# Patient Record
Sex: FEMALE | Race: WHITE | HISPANIC OR LATINO | URBAN - METROPOLITAN AREA
[De-identification: names, ages, dates, MRNs, and addresses within clinical notes are randomized per-mention and may not be internally consistent; named-entity substitution may affect disease eponyms.]

---

## 2017-03-22 ENCOUNTER — OUTPATIENT (OUTPATIENT)
Dept: OUTPATIENT SERVICES | Facility: HOSPITAL | Age: 32
LOS: 1 days | Discharge: HOME | End: 2017-03-22

## 2017-06-27 DIAGNOSIS — F12.10 CANNABIS ABUSE, UNCOMPLICATED: ICD-10-CM

## 2017-06-27 DIAGNOSIS — F10.10 ALCOHOL ABUSE, UNCOMPLICATED: ICD-10-CM

## 2019-07-23 ENCOUNTER — TRANSCRIPTION ENCOUNTER (OUTPATIENT)
Age: 34
End: 2019-07-23

## 2019-07-27 ENCOUNTER — TRANSCRIPTION ENCOUNTER (OUTPATIENT)
Age: 34
End: 2019-07-27

## 2019-08-08 ENCOUNTER — TRANSCRIPTION ENCOUNTER (OUTPATIENT)
Age: 34
End: 2019-08-08

## 2020-04-26 ENCOUNTER — MESSAGE (OUTPATIENT)
Age: 35
End: 2020-04-26

## 2022-03-19 PROBLEM — Z00.00 ENCOUNTER FOR PREVENTIVE HEALTH EXAMINATION: Status: ACTIVE | Noted: 2022-03-19

## 2022-07-25 ENCOUNTER — NON-APPOINTMENT (OUTPATIENT)
Age: 37
End: 2022-07-25

## 2022-11-06 ENCOUNTER — EMERGENCY (EMERGENCY)
Facility: HOSPITAL | Age: 37
LOS: 0 days | Discharge: HOME | End: 2022-11-06
Attending: EMERGENCY MEDICINE | Admitting: EMERGENCY MEDICINE

## 2022-11-06 VITALS
RESPIRATION RATE: 20 BRPM | SYSTOLIC BLOOD PRESSURE: 122 MMHG | OXYGEN SATURATION: 99 % | HEART RATE: 64 BPM | TEMPERATURE: 98 F | HEIGHT: 61 IN | DIASTOLIC BLOOD PRESSURE: 58 MMHG | WEIGHT: 123.02 LBS

## 2022-11-06 VITALS
HEART RATE: 67 BPM | RESPIRATION RATE: 18 BRPM | OXYGEN SATURATION: 97 % | SYSTOLIC BLOOD PRESSURE: 113 MMHG | DIASTOLIC BLOOD PRESSURE: 81 MMHG | TEMPERATURE: 96 F

## 2022-11-06 DIAGNOSIS — M25.512 PAIN IN LEFT SHOULDER: ICD-10-CM

## 2022-11-06 DIAGNOSIS — M54.2 CERVICALGIA: ICD-10-CM

## 2022-11-06 DIAGNOSIS — M54.6 PAIN IN THORACIC SPINE: ICD-10-CM

## 2022-11-06 DIAGNOSIS — Z87.39 PERSONAL HISTORY OF OTHER DISEASES OF THE MUSCULOSKELETAL SYSTEM AND CONNECTIVE TISSUE: ICD-10-CM

## 2022-11-06 PROCEDURE — 99284 EMERGENCY DEPT VISIT MOD MDM: CPT

## 2022-11-06 RX ORDER — OXYCODONE AND ACETAMINOPHEN 5; 325 MG/1; MG/1
2 TABLET ORAL ONCE
Refills: 0 | Status: DISCONTINUED | OUTPATIENT
Start: 2022-11-06 | End: 2022-11-06

## 2022-11-06 RX ORDER — DEXAMETHASONE 0.5 MG/5ML
10 ELIXIR ORAL ONCE
Refills: 0 | Status: COMPLETED | OUTPATIENT
Start: 2022-11-06 | End: 2022-11-06

## 2022-11-06 RX ORDER — KETOROLAC TROMETHAMINE 30 MG/ML
30 SYRINGE (ML) INJECTION ONCE
Refills: 0 | Status: DISCONTINUED | OUTPATIENT
Start: 2022-11-06 | End: 2022-11-06

## 2022-11-06 RX ADMIN — OXYCODONE AND ACETAMINOPHEN 2 TABLET(S): 5; 325 TABLET ORAL at 10:08

## 2022-11-06 RX ADMIN — Medication 10 MILLIGRAM(S): at 11:02

## 2022-11-06 RX ADMIN — Medication 30 MILLIGRAM(S): at 10:08

## 2022-11-06 NOTE — ED PROVIDER NOTE - NS ED ATTENDING STATEMENT MOD
This was a shared visit with the LEONARD. I reviewed and verified the documentation and independently performed the documented:

## 2022-11-06 NOTE — ED PROVIDER NOTE - CARE PROVIDER_API CALL
Adrianna Natarajan)  Neurosurgery  [address], [address]  [place] [address]  Phone: [phone]  Fax: [phone]  Follow Up Time:

## 2022-11-06 NOTE — ED PROVIDER NOTE - OBJECTIVE STATEMENT
Patient c/o several weeks of left sided neck pain radiating to shoulder, H/o herniated cervical disc. Seen by PMD take naprosyn, robaxin without improvement,

## 2022-11-06 NOTE — ED PROVIDER NOTE - PATIENT PORTAL LINK FT
You can access the FollowMyHealth Patient Portal offered by [facility] by registering at the following website: [email or web address]. By joining [facility]’s FollowMyHealth portal, you will also be able to view your health information using other applications (apps) compatible with our system.

## 2022-11-06 NOTE — ED PROVIDER NOTE - NSFOLLOWUPINSTRUCTIONS_ED_ALL_ED_FT
continue meds, Take medrol dose pack as directed, See rehab and neurosurgery for follow up   Our Emergency Department Referral Coordinators will be reaching out to you in the next 24-48 hours from 9:00am to 5:00pm with a follow up appointment. Please expect a phone call from the hospital in that time frame. If you do not receive a call or if you have any questions or concerns, you can reach them at [phone] or [phone].

## 2022-11-06 NOTE — ED PROVIDER NOTE - CLINICAL SUMMARY MEDICAL DECISION MAKING FREE TEXT BOX
chronic intermittent pain to the neck related to bulging discs. Pain meds and short course of steroids.

## 2022-11-06 NOTE — ED PROVIDER NOTE - ATTENDING APP SHARED VISIT CONTRIBUTION OF CARE
Left upper back pain for 2-3 weeks. Has had in the past. Hx of bulging disc in the cervical spine. No shortness of breath. No rash. Tingling sensation to the left arm. Pt take different pain meds. On exam left upper back tenderness, no rash, good motor strength upper extremity and good sensory. Normal gait.

## 2022-11-06 NOTE — ED PROVIDER NOTE - MUSCULOSKELETAL, MLM
tenderness left neck and shoulder region with muscle spasm, FROM, NV intact, no weakness, sensation intact

## 2022-11-07 PROBLEM — Z78.9 OTHER SPECIFIED HEALTH STATUS: Chronic | Status: ACTIVE | Noted: 2022-11-06

## 2022-11-08 ENCOUNTER — EMERGENCY (EMERGENCY)
Facility: HOSPITAL | Age: 37
LOS: 0 days | Discharge: HOME | End: 2022-11-08
Admitting: EMERGENCY MEDICINE

## 2022-11-08 VITALS
TEMPERATURE: 97 F | OXYGEN SATURATION: 100 % | HEART RATE: 61 BPM | HEIGHT: 61 IN | DIASTOLIC BLOOD PRESSURE: 82 MMHG | SYSTOLIC BLOOD PRESSURE: 122 MMHG | RESPIRATION RATE: 16 BRPM | WEIGHT: 123.02 LBS

## 2022-11-08 DIAGNOSIS — M79.602 PAIN IN LEFT ARM: ICD-10-CM

## 2022-11-08 DIAGNOSIS — M54.2 CERVICALGIA: ICD-10-CM

## 2022-11-08 DIAGNOSIS — M54.12 RADICULOPATHY, CERVICAL REGION: ICD-10-CM

## 2022-11-08 PROCEDURE — 99284 EMERGENCY DEPT VISIT MOD MDM: CPT

## 2022-11-08 RX ORDER — ONDANSETRON 8 MG/1
4 TABLET, FILM COATED ORAL ONCE
Refills: 0 | Status: DISCONTINUED | OUTPATIENT
Start: 2022-11-08 | End: 2022-11-08

## 2022-11-08 RX ORDER — KETOROLAC TROMETHAMINE 30 MG/ML
30 SYRINGE (ML) INJECTION ONCE
Refills: 0 | Status: DISCONTINUED | OUTPATIENT
Start: 2022-11-08 | End: 2022-11-08

## 2022-11-08 RX ORDER — TIZANIDINE 4 MG/1
1 TABLET ORAL
Qty: 15 | Refills: 0
Start: 2022-11-08 | End: 2022-11-12

## 2022-11-08 RX ORDER — ONDANSETRON 8 MG/1
1 TABLET, FILM COATED ORAL
Qty: 8 | Refills: 0
Start: 2022-11-08 | End: 2022-11-11

## 2022-11-08 RX ADMIN — Medication 30 MILLIGRAM(S): at 13:03

## 2022-11-08 RX ADMIN — Medication 50 MILLIGRAM(S): at 13:03
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
## 2022-11-08 NOTE — ED PROVIDER NOTE - NS ED ROS FT
Constitutional: (-) fever  Eyes/ENT: (-) visual changes   Cardiovascular: (-) chest pain, (-) syncope  Respiratory: (-) cough, (-) shortness of breath  Gastrointestinal: (-) vomiting, (-) diarrhea  Genitourinary: (-) dysuria, (-) hesitancy, (-) frequency   Musculoskeletal: (-) neck pain, (-) back pain, L arm pain  Integumentary: (-) rash, (-) edema  Neurological: (-) headache, (-) altered mental status  Allergic/Immunologic: (-) pruritus

## 2022-11-08 NOTE — ED PROVIDER NOTE - CLINICAL SUMMARY MEDICAL DECISION MAKING FREE TEXT BOX
37-year-old woman presents with severe left-sided neck pain traveling down into the left arm for the past few days not responsive to outpatient therapy.  Patient clearly with signs of cervical radiculopathy on examination.  Patient has schedule appointment with neurosurgery at the end of December.  Patient is currently just started a Medrol Dosepak.  Patient will require analgesia and prednisone rather than Medrol Dosepak.  We will also provide a referral for to provide patient with a sooner appointment with neurosurgery hopefully. No left arm weakness or evidence of atrophy on exam. Reassurance analgesia outpatient follow-up return for any new or worsening symptoms.

## 2022-11-08 NOTE — ED PROVIDER NOTE - NSFOLLOWUPINSTRUCTIONS_ED_ALL_ED_FT
Please follow up with your primary care physician within 24-72 hours and return immediately if symptoms worsen.    Arm Pain    WHAT YOU NEED TO KNOW:    Your arm pain may be caused by a number of conditions. Examples include arthritis, nerve problems, or an awkward position while you sleep. X-rays did not show a broken bone in your arm or wrist. Arm pain may be a sign of a serious condition that needs immediate care, such as a heart attack.    DISCHARGE INSTRUCTIONS:    Call 911 for any of the following: You have any of the following signs of a heart attack:     Squeezing, pressure, or pain in your chest that lasts longer than 5 minutes or returns      Discomfort or pain in your back, neck, jaw, stomach, or arm       Trouble breathing or a fast, fluttery heartbeat      Nausea or vomiting      Lightheadedness or a sudden cold sweat, especially with chest pain or trouble breathing    Return to the emergency department if:     You have severe pain, or pain that spreads from your arm to other areas.      You have swelling, tingling, or numbness in your hand or fingers, or the skin turns blue.      You cannot move your arm.    Contact your healthcare provider if:     You have questions or concerns about your condition or care.        Medicines: You may need any of the following:     Prescription pain medicine may be given. Ask how to take this medicine safely.      NSAIDs, such as ibuprofen, help decrease swelling, pain, and fever. This medicine is available with or without a doctor's order. NSAIDs can cause stomach bleeding or kidney problems in certain people. If you take blood thinner medicine, always ask your healthcare provider if NSAIDs are safe for you. Always read the medicine label and follow directions.      Take your medicine as directed. Contact your healthcare provider if you think your medicine is not helping or if you have side effects. Tell him or her if you are allergic to any medicine. Keep a list of the medicines, vitamins, and herbs you take. Include the amounts, and when and why you take them. Bring the list or the pill bottles to follow-up visits. Carry your medicine list with you in case of an emergency.    Self-care:     Rest your arm as directed. A sling may be used to keep your arm from moving while it heals.      Apply ice as directed. Ice helps decrease pain and swelling. Ice may also help prevent tissue damage. Use an ice pack, or put crushed ice in a plastic bag. Cover it with a towel. Apply it to your arm for 20 minutes every few hours, or as directed. Ask how many times to apply ice each day, and for how many days.      Elevate your arm above the level of your heart as often as you can. This will help decrease swelling and pain. Prop your arm on pillows or blankets to keep the area elevated comfortably.      Adjust your position if you work in front of a computer. You may need arm or wrist supports or change the height of your chair.       Keep a pain record. Write down when your pain happens and how severe it is. Include any other symptoms you have with your pain. A record will help you keep track of pain cycles. Bring the record with you to your follow-up visits. It may also help your healthcare provider find out what is causing your pain.    Follow up with your healthcare provider as directed: You may need physical therapy. You may need to see an orthopedic specialist. Write down your questions so you remember to ask them during your visits.       © Copyright [employer] 2019 All illustrations and images included in CareNotes are the copyrighted property of [employer] Inc[employer] or [employer].

## 2022-11-08 NOTE — ED PROVIDER NOTE - PHYSICAL EXAMINATION
Gen: NAD, AOx3  Head: NCAT  HEENT: PERRL, oral mucosa moist, normal conjunctiva, oropharynx clear without exudate or erythema  Lung: CTAB, no respiratory distress, no wheezing, rales, rhonchi  CV: normal s1/s2, rrr, Normal perfusion, pulses 2+ throughout  Abd: soft, NTND, no CVA tenderness  Genitourinary: no pelvic tenderness  MSK: No edema, no visible deformities, full range of motion in all 4 extremities   Neuro: CN II-XII grossly intact, No focal neurologic deficits, strength 5/5 BUE/BLE, steady gait   Skin: No rash   Psych: normal affect

## 2022-11-08 NOTE — ED PROVIDER NOTE - PATIENT PORTAL LINK FT
You can access the FollowMyHealth Patient Portal offered by [facility] by registering at the following website: [email or web address]. By joining [facility]’s FollowMyHealth portal, you will also be able to view your health information using other applications (apps) compatible with our system.

## 2022-11-10 ENCOUNTER — APPOINTMENT (OUTPATIENT)
Dept: PAIN MANAGEMENT | Facility: CLINIC | Age: 37
End: 2022-11-10

## 2022-11-10 VITALS
HEIGHT: 61 IN | BODY MASS INDEX: 23.22 KG/M2 | SYSTOLIC BLOOD PRESSURE: 120 MMHG | WEIGHT: 123 LBS | DIASTOLIC BLOOD PRESSURE: 92 MMHG | HEART RATE: 97 BPM

## 2022-11-10 PROCEDURE — 99204 OFFICE O/P NEW MOD 45 MIN: CPT

## 2022-11-11 NOTE — REASON FOR VISIT
[Initial Consultation] : an initial pain management consultation [FreeTextEntry2] : evaluation neck back left arm and hand

## 2022-11-11 NOTE — PHYSICAL EXAM
[de-identified] : Cervical Spine Exam:\par \par Inspection:\par \par erythema (-) \par ecchymosis (-) \par rashes (-) \par \par Palpation:   \par                                                \par Cervical paraspinal mm tenderness:   R (-); L(-)\par Upper trapezius mm tenderness:        R (-); L (-)\par \par ROM:  \par restricted ROM in lateral rotation b/l, flexion, extension  \par \par Strength Testing:            Right    Left\par Deltoid                             (5/5)    (5/5)\par Biceps:                            (5/5)    (5/5)\par Triceps:                           (5/5)    (5/5)\par Finger Abductors:           (5/5)    (4/5)\par Grasp:                             (5/5)    (4/5)\par +bentley's b/l\par Special Testing:\par Spurling Test:                  R (-); L (+)\par Facet load test:               R (-); L (-)          \par \par \par

## 2022-11-11 NOTE — DISCUSSION/SUMMARY
[de-identified] : A discussion regarding available pain management treatment options occurred with the patient.  These included interventional, rehabilitative, pharmacological, and alternative modalities. We will proceed with the following:\par \par Interventional treatment options:\par discussed option of ANGIE\par \par Rehabilitative options:\par holding off until MRI completed due to + bentley's signs and severe pain\par \par Medication based treatment options:\par she is currently on prednisone 50mg for 2 more days. I ordered mobic 15 mg to take daily after she finishes te steroid if pain does not improve. Also ordered gabapentin with an uptitration to 300 TID\par \par Complementary treatment options:\par avoid sitting at a desk more than 40 minutes and to stand\par \par The patient has been having persistent neck pain with radicular features down left arm. At this point we decided to proceed with an MRI of the cervical spine spine without contrast to evaluate the pathology and give the patient treatment options to help with the pain. Potential treatment options such as further conservative therapy, injections, and surgery were also briefly discussed. The medications listed below were also prescribed today. The risks and benefits of these medications were also discussed as well as the manner in which they should be taken. The patient will follow up after the MRI.\par \par \par f/u after cervical MRI NC

## 2022-11-11 NOTE — HISTORY OF PRESENT ILLNESS
[FreeTextEntry1] : 36 yo female presents with 16 year hx of neck pain that significantly worsened in last 5 days after waking up that radiates down her shoulder, arm, forearm and hand which is almost constant with the slightest neck moevement and even walking. The pain is sharp, burning, tingling, stinging. She admits to difficulty opening jars and subjective weakness in her left hand. She denies bowel/bladder incontinence, tripping falls, unbalanced. She vomitted when taking a medrol dose pack so was switched to prednisone 50 mg for 4 days which was prescribed by the ER. along with a toradol shot which nothing has helped thus far. She is a very active young lady and this is impeding her ADLs and QOL as it is 10/10 most of the day.

## 2022-11-30 ENCOUNTER — APPOINTMENT (OUTPATIENT)
Dept: NEUROSURGERY | Facility: CLINIC | Age: 37
End: 2022-11-30

## 2022-11-30 VITALS — HEIGHT: 61 IN | WEIGHT: 123 LBS | BODY MASS INDEX: 23.22 KG/M2

## 2022-11-30 DIAGNOSIS — Z78.9 OTHER SPECIFIED HEALTH STATUS: ICD-10-CM

## 2022-11-30 DIAGNOSIS — Z87.19 PERSONAL HISTORY OF OTHER DISEASES OF THE DIGESTIVE SYSTEM: ICD-10-CM

## 2022-11-30 PROCEDURE — 99205 OFFICE O/P NEW HI 60 MIN: CPT

## 2022-12-01 ENCOUNTER — APPOINTMENT (OUTPATIENT)
Dept: PAIN MANAGEMENT | Facility: CLINIC | Age: 37
End: 2022-12-01

## 2022-12-01 PROCEDURE — 99214 OFFICE O/P EST MOD 30 MIN: CPT

## 2022-12-01 RX ORDER — MELOXICAM 15 MG/1
15 TABLET ORAL DAILY
Qty: 14 | Refills: 0 | Status: ACTIVE | COMMUNITY
Start: 2022-11-10 | End: 1900-01-01

## 2022-12-01 NOTE — DISCUSSION/SUMMARY
[de-identified] : A discussion regarding available pain management treatment options occurred with the patient.  These included interventional, rehabilitative, pharmacological, and alternative modalities. We will proceed with the following:\par \par Interventional treatment options:\par discussed option of ANGIE\par \par Rehabilitative options:\par in PT currently. Pt had 75% relief with NSAIDs, gabapentin and PT\par \par Medication based treatment options:\par reordered mobic 15mg for 2 weeks and gabapentin 300mg TID\par \par Complementary treatment options:\par avoid sitting at a desk more than 40 minutes and to stand\par \par MR C Spine- R- 11/15/2022- evidence of extruded disc herniation left C6-7 with left C6 neuroforaminal narrowing to a severe degree.\par NSx recommended conservative tx for now

## 2022-12-01 NOTE — HISTORY OF PRESENT ILLNESS
[FreeTextEntry1] : 36 yo female presents with 16 year hx of neck pain that significantly worsened in last 5 days after waking up that radiates down her shoulder, arm, forearm and hand which is almost constant with the slightest neck moevement and even walking. The pain is sharp, burning, tingling, stinging. She admits to difficulty opening jars and subjective weakness in her left hand. She denies bowel/bladder incontinence, tripping falls, unbalanced. She vomitted when taking a medrol dose pack so was switched to prednisone 50 mg for 4 days which was prescribed by the ER. along with a toradol shot which nothing has helped thus far. She is a very active young lady and this is impeding her ADLs and QOL as it is 10/10 most of the day. \par \par interval hx 12/1/22\par Pt has 75% relief with PT, NSAIDs and gabapentin to date. She still has left sided neck pain that radiates down her arm into her hand that is sharp, burning, tingling aching nature that takes many minutes to subside. It is less frequent and is able to sleep more than before. She denies bowel/bladder incontinence, weakness, falls, unbalanced. The pain still can get severe at times but is less frequent.

## 2022-12-01 NOTE — PHYSICAL EXAM
[de-identified] : Cervical Spine Exam:\par \par Inspection:\par \par erythema (-) \par ecchymosis (-) \par rashes (-) \par \par Palpation:   \par                                                \par Cervical paraspinal mm tenderness:   R (-); L(-)\par Upper trapezius mm tenderness:        R (-); L (-)\par \par ROM:  \par restricted ROM in lateral rotation b/l, flexion, extension  \par \par +bentley's left side\par Special Testing:\par Spurling Test:                  R (-); L (+)\par Facet load test:               R (-); L (-)          \par \par \par
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
## 2022-12-02 NOTE — ASSESSMENT
[FreeTextEntry1] : This is a 37-year-old female who presents for neurosurgical consultation with regards to cervical disc displacement with an extrusion of disc material at the left C6-7 segment with left C6 neuroforaminal involvement.  She has begun physical therapy and has appreciated a reduction in the intensity of her pain.  She wishes to exhaust conservative and interventional treatment efforts.  I have suggested that she continue with physical therapy and she may also consider a cervical epidural through Dr. Oneal.\par \par I will conduct a telemedicine encounter with her in the next 6-8 weeks to review her improvements thus far and to determine if neurosurgical intervention is warranted. We have outlined the indication for a TDR if she were to fail interventional and conservative efforts and she will research this proposed treatment option.\par \par Elsi Wisdom PA-C\par Adrianna Natarajan MD

## 2022-12-06 ENCOUNTER — APPOINTMENT (OUTPATIENT)
Dept: NEUROSURGERY | Facility: CLINIC | Age: 37
End: 2022-12-06

## 2022-12-22 ENCOUNTER — APPOINTMENT (OUTPATIENT)
Dept: PAIN MANAGEMENT | Facility: CLINIC | Age: 37
End: 2022-12-22

## 2022-12-22 VITALS — BODY MASS INDEX: 23.22 KG/M2 | WEIGHT: 123 LBS | HEIGHT: 61 IN

## 2022-12-22 PROCEDURE — 99213 OFFICE O/P EST LOW 20 MIN: CPT

## 2022-12-22 NOTE — HISTORY OF PRESENT ILLNESS
[FreeTextEntry1] : 36 yo female presents with 16 year hx of neck pain that significantly worsened in last 5 days after waking up that radiates down her shoulder, arm, forearm and hand which is almost constant with the slightest neck moevement and even walking. The pain is sharp, burning, tingling, stinging. She admits to difficulty opening jars and subjective weakness in her left hand. She denies bowel/bladder incontinence, tripping falls, unbalanced. She vomitted when taking a medrol dose pack so was switched to prednisone 50 mg for 4 days which was prescribed by the ER. along with a toradol shot which nothing has helped thus far. She is a very active young lady and this is impeding her ADLs and QOL as it is 10/10 most of the day. \par \par interval hx 12/1/22\par Pt has 75% relief with PT, NSAIDs and gabapentin to date. She still has left sided neck pain that radiates down her arm into her hand that is sharp, burning, tingling aching nature that takes many minutes to subside. It is less frequent and is able to sleep more than before. She denies bowel/bladder incontinence, weakness, falls, unbalanced. The pain still can get severe at times but is less frequent. \par \par interval hx 12/22/22\par Pt had about 90% pain relief of her left neck and radicular pain but continues to have numbness in her left index finger. She states PT and mobic has helped. She has been slowly titrating off gabapentin. She states her neck does cause pain with certain movements. She denies bowel/bladder incontinence, weakness, falls.

## 2022-12-22 NOTE — DISCUSSION/SUMMARY
[de-identified] : A discussion regarding available pain management treatment options occurred with the patient.  These included interventional, rehabilitative, pharmacological, and alternative modalities. We will proceed with the following:\par \par Interventional treatment options:\par deferred for now\par \par Rehabilitative options:\par c/w PT for cervical radic\par \par Medication based treatment options:\par plan to titrate off gabapentin. wants to hold off on mobic 15 mg daily\par \par \par MR C Spine- [facility]- 11/15/2022- evidence of extruded disc herniation left C6-7 with left C6 neuroforaminal narrowing to a severe degree.\par NSx recommended conservative tx for now\par \par f/u prn

## 2022-12-22 NOTE — PHYSICAL EXAM
[de-identified] : Cervical Spine Exam:\par \par Palpation:   \par                                               \par \par +bentley's left side\par Special Testing:\par Spurling Test:                  R (-); L (+)\par Facet load test:               R (-); L (-)          \par \par \par

## 2022-12-30 ENCOUNTER — RX RENEWAL (OUTPATIENT)
Age: 37
End: 2022-12-30

## 2022-12-30 RX ORDER — GABAPENTIN 300 MG/1
300 CAPSULE ORAL 3 TIMES DAILY
Qty: 90 | Refills: 0 | Status: ACTIVE | COMMUNITY
Start: 2022-11-10 | End: 1900-01-01

## 2023-01-18 ENCOUNTER — TRANSCRIPTION ENCOUNTER (OUTPATIENT)
Age: 38
End: 2023-01-18

## 2023-01-18 ENCOUNTER — APPOINTMENT (OUTPATIENT)
Dept: NEUROSURGERY | Facility: CLINIC | Age: 38
End: 2023-01-18
Payer: COMMERCIAL

## 2023-01-18 PROCEDURE — 99441: CPT

## 2023-01-19 NOTE — REASON FOR VISIT
[FreeTextEntry1] : feeling better\par persistent numbness but overall better\par doing PT\par pain management for injections\par will call if symptoms recur or persist

## 2024-04-11 ENCOUNTER — NON-APPOINTMENT (OUTPATIENT)
Age: 39
End: 2024-04-11

## 2024-05-01 NOTE — ED PROVIDER NOTE - CPE EDP NEURO NORM
normal...
88 y/o F PMH of Afib (no a/c), HTN, diastolic CHF presenting with facial droop and aphasia  NIHSS: 10. CODE STROKE on arrival   LKW 9:45 the previous evening however patient found moaning in on bathroom floor around 8:50 with CT imaging showing insular infarct and M2 occlusion.   Likely within window for TNK as most likely event occurred this morning around 8:50  TNK given @10:33 AM. This was discussed with Teleneurology Dr. Holly  Patient stated on low dose nicardipine infusion with labetalol PRN IVP to maintain <180/105  Patient transferred emerg[facility] to [facility] for NeuroIR mechanical thrombectomy     The patient's condition is critical. Management options were put in place to ensure further deterioration does not occur. In addition to the usual care provided, I have spent additional time with this patient through but not limited to the following: additional documentation  reviewing test results,  discussing with consultants,  discussing with patient / patient's family prognosis and course of care,  reassessment of patient's status and response to interventions.

## 2024-05-16 ENCOUNTER — APPOINTMENT (OUTPATIENT)
Dept: PAIN MANAGEMENT | Facility: CLINIC | Age: 39
End: 2024-05-16
Payer: COMMERCIAL

## 2024-05-16 DIAGNOSIS — M54.16 RADICULOPATHY, LUMBAR REGION: ICD-10-CM

## 2024-05-16 PROCEDURE — 99214 OFFICE O/P EST MOD 30 MIN: CPT

## 2024-05-16 RX ORDER — METHYLPREDNISOLONE 4 MG/1
4 TABLET ORAL
Qty: 1 | Refills: 0 | Status: ACTIVE | COMMUNITY
Start: 2024-05-16 | End: 1900-01-01

## 2024-05-16 NOTE — HISTORY OF PRESENT ILLNESS
[FreeTextEntry1] : HISTORY OF PRESENT ILLNESS: 37-year-old female presents with 16 year hx of neck pain that significantly worsened in last 5 days after waking up that radiates down her shoulder, arm, forearm and hand which is almost constant with the slightest neck moevement and even walking. The pain is sharp, burning, tingling, stinging. She admits to difficulty opening jars and subjective weakness in her left hand. She denies bowel/bladder incontinence, tripping falls, unbalanced. She vomitted when taking a medrol dose pack so was switched to prednisone 50 mg for 4 days which was prescribed by the ER. along with a toradol shot which nothing has helped thus far. She is a very active young lady and this is impeding her ADLs and QOL as it is 10/10 most of the day.   PRESENTING TODAY 05-: Patient presents to the office today for a follow up visit with continued low back and neck pain which refers into her left shoulder blade down her arm associated with constant numbness into her fingers. She notes she has been completing a home exercise regimen focusing on pectoral stretching, scapular stabilizer strengthening and chin tuck since early December (10+ weeks) for at least 30 mins a day 4x a week which has been providing her with minimal to no relief.   In regard to her low back pain, she describes the pain as dull, achy and throbbing, she denies radiculopathy into her lower extremities, the pain comes on with prolonged sitting and is made worse by transitional movements to the standing position. She has tried NSAIDs with no significant relief. She rates her pain an 8 out of 10 on the pain scale.

## 2024-05-16 NOTE — PHYSICAL EXAM
[de-identified] : BACK- Seated slump +RT. Facet loading +RT.   Cervical Spine Exam:  Palpation:                                  Special Testing: Spurling Test:                  R (-); L (+) Facet load test:               R (-); L (-)

## 2024-05-16 NOTE — DISCUSSION/SUMMARY
[de-identified] : A discussion regarding available pain management treatment options occurred with the patient.  These included interventional, rehabilitative, pharmacological, and alternative modalities. We will proceed with the following:  Interventional treatment options: - Potential treatment options such as further conservative therapy, injections, and surgery were briefly discussed.  Rehabilitative options: -Participation in active HEP was discussed and printed. *I gave the patient a list of home exercises to do for pain reduction and overall improvement in functional status including but not limited to active neck rotation, active neck side bend, neck flexion, neck extension, chin tuck, scalene stretch, isometric neck flexion, isometric neck extension, isometric neck side bend, head lift/neck curl, head lift/neck side bend, neck extension on hands and knees, and scapula squeeze. *LUMBAR DISC: Patient given specific exercises to do including side plank, Quadruped arm/leg raise, Extension exercise, and Glut Bridges.   Medication based treatment options: - ordered a Medrol Dose Pack 4mg, use as directed for a duration of 6 days.  Complementary treatment options:  - Patient was advised to stay away from any heavy lifting. If needed, she was advised to squat and not bend forward. - Initiate physician directed activity and lifestyle modifications.  Follow up in 4-6 weeks for reassessment.  I, Aidan Kay, attest that this documentation has been prepared under the direction and in the presence of Provider Otf Oneal, DO The documentation recorded by the scribe, in my presence, accurately reflects the service I personally performed, and the decisions made by me with my edits as appropriate.   Best Regards, Otf Onael D.O.

## 2024-05-16 NOTE — DATA REVIEWED
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

## 2024-06-28 ENCOUNTER — APPOINTMENT (OUTPATIENT)
Dept: PAIN MANAGEMENT | Facility: CLINIC | Age: 39
End: 2024-06-28
Payer: COMMERCIAL

## 2024-06-28 DIAGNOSIS — M50.10 CERVICAL DISC DISORDER WITH RADICULOPATHY, UNSPECIFIED CERVICAL REGION: ICD-10-CM

## 2024-06-28 DIAGNOSIS — M54.12 RADICULOPATHY, CERVICAL REGION: ICD-10-CM

## 2024-06-28 PROCEDURE — 99214 OFFICE O/P EST MOD 30 MIN: CPT

## 2024-06-30 PROBLEM — M50.10 HERNIATION OF CERVICAL INTERVERTEBRAL DISC WITH RADICULOPATHY: Status: ACTIVE | Noted: 2022-11-30

## 2024-06-30 PROBLEM — M54.12 CERVICAL RADICULAR PAIN: Status: ACTIVE | Noted: 2022-11-10

## 2024-08-10 ENCOUNTER — APPOINTMENT (OUTPATIENT)
Dept: ORTHOPEDIC SURGERY | Facility: HOSPITAL | Age: 39
End: 2024-08-10

## 2024-08-10 ENCOUNTER — OUTPATIENT (OUTPATIENT)
Dept: OUTPATIENT SERVICES | Facility: HOSPITAL | Age: 39
LOS: 1 days | Discharge: HOME | End: 2024-08-10

## 2024-08-10 DIAGNOSIS — M54.12 RADICULOPATHY, CERVICAL REGION: ICD-10-CM

## 2024-08-10 DIAGNOSIS — M54.16 RADICULOPATHY, LUMBAR REGION: ICD-10-CM

## 2024-08-10 PROCEDURE — 62321 NJX INTERLAMINAR CRV/THRC: CPT

## 2024-08-23 ENCOUNTER — APPOINTMENT (OUTPATIENT)
Dept: PAIN MANAGEMENT | Facility: CLINIC | Age: 39
End: 2024-08-23
Payer: COMMERCIAL

## 2024-08-23 DIAGNOSIS — M50.10 CERVICAL DISC DISORDER WITH RADICULOPATHY, UNSPECIFIED CERVICAL REGION: ICD-10-CM

## 2024-08-23 PROCEDURE — 99214 OFFICE O/P EST MOD 30 MIN: CPT

## 2024-08-23 NOTE — PHYSICAL EXAM
[de-identified] :  Cervical Spine Exam: Special Testing: Spurling Test:                  R (-); L (+) Facet load test:               R (-); L (-)

## 2024-08-23 NOTE — DISCUSSION/SUMMARY
[de-identified] : A discussion regarding available pain management treatment options occurred with the patient.  These included interventional, rehabilitative, pharmacological, and alternative modalities. We will proceed with the following:  Interventional treatment options:  1. Treatment options were discussed with the patient. The patient has been having persistent neck pain with radiculopathy with minimal improvement with conservative therapies. The patient was given the option to proceed with a cervical epidural steroid injection to try to get some pain relief.  If we are unable to get pain relief with this procedure, we will reassess our options before proceeding.  The risks and benefits were discussed which included bleeding, infection, nerve injury, no pain relief or worse, increased pain. All questions were answered, and the patient will schedule for the injection on the next available date. C7-T1 NO MAC.   Medication based treatment options: - ordered a Medrol Dose Pack 4mg, use as directed for a duration of 6 days. - c/w diclofenac 75mg BID for 30 days. - c/w Gabapentin.   Complementary treatment options:  - Patient was advised to stay away from any heavy lifting. If needed, she was advised to squat and not bend forward. - Physician directed activity and lifestyle modifications.  Follow up 1-2 weeks post injection for assessment of efficacy and further recommendations.  I, Aidan Kay, attest that this documentation has been prepared under the direction and in the presence of Provider Otf Oneal, DO The documentation recorded by the scribe, in my presence, accurately reflects the service I personally performed, and the decisions made by me with my edits as appropriate.   Best Regards, Otf Oneal D.O.

## 2024-08-23 NOTE — HISTORY OF PRESENT ILLNESS
[FreeTextEntry1] : HISTORY OF PRESENT ILLNESS: 37-year-old female presents with 16 year hx of neck pain that significantly worsened in last 5 days after waking up that radiates down her shoulder, arm, forearm and hand which is almost constant with the slightest neck moevement and even walking. The pain is sharp, burning, tingling, stinging. She admits to difficulty opening jars and subjective weakness in her left hand. She denies bowel/bladder incontinence, tripping falls, unbalanced. She vomitted when taking a medrol dose pack so was switched to prednisone 50 mg for 4 days which was prescribed by the ER. along with a toradol shot which nothing has helped thus far. She is a very active young lady and this is impeding her ADLs and QOL as it is 10/10 most of the day.    08-: Patient presents to the office today for a follow up visit with continued low back and neck pain which refers into her left shoulder blade down her arm associated with constant numbness into her fingers. She notes she has been completing a home exercise regimen focusing on pectoral stretching, scapular stabilizer strengthening and chin tuck since early December (10+ weeks) for at least 30 mins a day 4x a week which has been providing her with minimal to no relief.   In regard to her low back pain, she describes the pain as dull, achy and throbbing, she denies radiculopathy into her lower extremities, the pain comes on with prolonged sitting and is made worse by transitional movements to the standing position. She has tried NSAIDs with no significant relief. She rates her pain an 8 out of 10 on the pain scale.   PRESENTING TODAY 08-: Patient presents to the office today for a follow up visit, she is status post a Cervical (C7-T1) interlaminar epidural steroid injection under fluoroscopic guidance on 08/10/24 which provided her with 50% relief for 2 days. She notes the pain has returned to baseline. She continues with neck pain with radicular features down both arms that is sharp and burning into her hands. Pt denies bowel/bladder incontinence, weakness, falls, unsteadiness. Pain is 8/10 at times during the day.

## 2024-08-23 NOTE — PHYSICAL EXAM
[de-identified] :  Cervical Spine Exam: Special Testing: Spurling Test:                  R (-); L (+) Facet load test:               R (-); L (-)

## 2024-08-23 NOTE — DISCUSSION/SUMMARY
[de-identified] : A discussion regarding available pain management treatment options occurred with the patient.  These included interventional, rehabilitative, pharmacological, and alternative modalities. We will proceed with the following:  Interventional treatment options:  1. Treatment options were discussed with the patient. The patient has been having persistent neck pain with radiculopathy with minimal improvement with conservative therapies. The patient was given the option to proceed with a cervical epidural steroid injection to try to get some pain relief.  If we are unable to get pain relief with this procedure, we will reassess our options before proceeding.  The risks and benefits were discussed which included bleeding, infection, nerve injury, no pain relief or worse, increased pain. All questions were answered, and the patient will schedule for the injection on the next available date. C7-T1 NO MAC.   Medication based treatment options: - ordered a Medrol Dose Pack 4mg, use as directed for a duration of 6 days. - c/w diclofenac 75mg BID for 30 days. - c/w Gabapentin.   Complementary treatment options:  - Patient was advised to stay away from any heavy lifting. If needed, she was advised to squat and not bend forward. - Physician directed activity and lifestyle modifications.  Follow up 1-2 weeks post injection for assessment of efficacy and further recommendations.  I, Aidan Kay, attest that this documentation has been prepared under the direction and in the presence of Provider Otf Oneal, DO The documentation recorded by the scribe, in my presence, accurately reflects the service I personally performed, and the decisions made by me with my edits as appropriate.   Best Regards, Otf Oneal D.O.

## 2024-08-29 ENCOUNTER — APPOINTMENT (OUTPATIENT)
Facility: CLINIC | Age: 39
End: 2024-08-29

## 2024-08-29 ENCOUNTER — APPOINTMENT (OUTPATIENT)
Dept: PAIN MANAGEMENT | Facility: CLINIC | Age: 39
End: 2024-08-29
Payer: COMMERCIAL

## 2024-08-29 DIAGNOSIS — M54.12 RADICULOPATHY, CERVICAL REGION: ICD-10-CM

## 2024-08-29 PROCEDURE — 62321 NJX INTERLAMINAR CRV/THRC: CPT

## 2024-08-29 NOTE — PROCEDURE
[FreeTextEntry3] : Date of Service: 08/29/2024   Account: [id number]  Patient: YAZAN REYNOLDS   YOB: 1985  Age: 39 year  Surgeon:      Otf Oneal DO  Assistant:    None  Pre-Operative Diagnosis:         Cervical Radiculopathy (M54.12)  Post Operative Diagnosis:       Cervical Radiculopathy (M54.12)  Procedure:             Cervical (C7-T1) interlaminar epidural steroid injection under fluoroscopic guidance  Anesthesia:  none  This procedure was carried out using fluoroscopic guidance.  The risks and benefits of the procedure were discussed extensively with the patient.  The consent of the patient was obtained and the following procedure was performed. The patient was placed in the prone position on the fluoroscopy table and the area was prepped and draped in a sterile fashion.  A timeout was performed with all essential staff present and the site and side were verified.  The patient was placed in the prone position and optimized to patient comfort.  The cervical area was prepped and draped in a sterile fashion.  The fluoroscope visualized the C7-T1 interspace using slight cephalad-caudad angulation and this area was marked.  Using sterile technique the superficial skin was anesthetized with 1% Lidocaine.  A 20 gauge 3.5 inch Tuohy needle was advanced under fluoroscopy until ligament was engaged.  Using a contralateral oblique view, a "loss of resistance" to air technique was utilized in order to gain access to the epidural space.  After negative aspiration for heme and CSF, 1 cc of Omnipaque contrast was administered and the appropriate cervical epidurogram was obtained in the DWAYNE and A/P view..  A total injectate of 3 cc of preservative free normal saline and 10mg of Dexamethasone was then injected into the epidural space while maintaining meaningful verbal contact with the patient.    The needle was subsequently removed.  Vital signs remained normal.  Pulse oximeter was used throughout the procedure and the patient's pulse and oxygen saturation remained within normal limits.  The patient tolerated the procedure well.  There were no complications.  The patient was instructed to apply ice over the injection sites for twenty minutes every two hours for the next 24 to 48 hours.  Disposition:       1. The patient was advised to F/U in 1-2 weeks to assess the response to the injection.      2. The patient was also instructed to contact me immediately if there were any concerns related to the procedure performed.    Otf Oneal DO

## 2024-09-13 ENCOUNTER — APPOINTMENT (OUTPATIENT)
Dept: PAIN MANAGEMENT | Facility: CLINIC | Age: 39
End: 2024-09-13
Payer: COMMERCIAL

## 2024-09-13 DIAGNOSIS — M54.12 RADICULOPATHY, CERVICAL REGION: ICD-10-CM

## 2024-09-13 DIAGNOSIS — M50.10 CERVICAL DISC DISORDER WITH RADICULOPATHY, UNSPECIFIED CERVICAL REGION: ICD-10-CM

## 2024-09-13 PROCEDURE — 99213 OFFICE O/P EST LOW 20 MIN: CPT

## 2024-09-14 NOTE — DISCUSSION/SUMMARY
[de-identified] : A discussion regarding available pain management treatment options occurred with the patient.  These included interventional, rehabilitative, pharmacological, and alternative modalities. We will proceed with the following:  Interventional treatment options:  - She would like to continue with conservative treatment in the interim.   Medication based treatment options: - c/w diclofenac 75mg BID for 30 days. - c/w Gabapentin.   Rehabilitative treatment options:  - Initiate Physical therapy of the lumbar 2-3 times a week for 4-8 weeks stressing a home exercise program of walking, shoulder griddle strengthening, swimming, elliptical , recumbent bike, Mac chi and Yoga. Use things that heat like hot shower or icy heat before rehab, exercising and at the beginning of the day, and ice (ice in a bag never directly on the skin) after activity and at the end of the day.   Complementary treatment options:  - Patient was advised to stay away from any heavy lifting. If needed, she was advised to squat and not bend forward. - Physician directed activity and lifestyle modifications.  Follow up in 7 weeks for reassessment.  I, Aidan Kay, attest that this documentation has been prepared under the direction and in the presence of Provider Otf Oneal, DO The documentation recorded by the scribe, in my presence, accurately reflects the service I personally performed, and the decisions made by me with my edits as appropriate.   Best Regards, Otf Oneal D.O.

## 2024-09-14 NOTE — PHYSICAL EXAM
[de-identified] :  Cervical Spine Exam: Special Testing: Spurling Test:                  R (-); L (+) Facet load test:               R (-); L (-)

## 2024-09-14 NOTE — HISTORY OF PRESENT ILLNESS
[FreeTextEntry1] : HISTORY OF PRESENT ILLNESS: 37-year-old female presents with 16 year hx of neck pain that significantly worsened in last 5 days after waking up that radiates down her shoulder, arm, forearm and hand which is almost constant with the slightest neck moevement and even walking. The pain is sharp, burning, tingling, stinging. She admits to difficulty opening jars and subjective weakness in her left hand. She denies bowel/bladder incontinence, tripping falls, unbalanced. She vomitted when taking a medrol dose pack so was switched to prednisone 50 mg for 4 days which was prescribed by the ER. along with a toradol shot which nothing has helped thus far. She is a very active young lady and this is impeding her ADLs and QOL as it is 10/10 most of the day.    08-: Patient presents to the office today for a follow up visit with continued low back and neck pain which refers into her left shoulder blade down her arm associated with constant numbness into her fingers. She notes she has been completing a home exercise regimen focusing on pectoral stretching, scapular stabilizer strengthening and chin tuck since early December (10+ weeks) for at least 30 mins a day 4x a week which has been providing her with minimal to no relief.   In regard to her low back pain, she describes the pain as dull, achy and throbbing, she denies radiculopathy into her lower extremities, the pain comes on with prolonged sitting and is made worse by transitional movements to the standing position. She has tried NSAIDs with no significant relief. She rates her pain an 8 out of 10 on the pain scale.   PRESENTING TODAY 09-: Patient presents to the office today for a follow up visit, she is status post a Cervical (C7-T1) interlaminar epidural steroid injection under fluoroscopic guidance on 08/29/24 which provided her with 50% relief for 2 days. She notes the pain has returned to baseline. She continues with neck pain with radicular features down both arms that is sharp and burning into her hands. Pt denies bowel/bladder incontinence, weakness, falls, unsteadiness. Pain is 8/10 at times during the day.

## 2024-09-14 NOTE — PHYSICAL EXAM
[de-identified] :  Cervical Spine Exam: Special Testing: Spurling Test:                  R (-); L (+) Facet load test:               R (-); L (-)

## 2024-09-14 NOTE — DISCUSSION/SUMMARY
[de-identified] : A discussion regarding available pain management treatment options occurred with the patient.  These included interventional, rehabilitative, pharmacological, and alternative modalities. We will proceed with the following:  Interventional treatment options:  - She would like to continue with conservative treatment in the interim.   Medication based treatment options: - c/w diclofenac 75mg BID for 30 days. - c/w Gabapentin.   Rehabilitative treatment options:  - Initiate Physical therapy of the lumbar 2-3 times a week for 4-8 weeks stressing a home exercise program of walking, shoulder griddle strengthening, swimming, elliptical , recumbent bike, Mac chi and Yoga. Use things that heat like hot shower or icy heat before rehab, exercising and at the beginning of the day, and ice (ice in a bag never directly on the skin) after activity and at the end of the day.   Complementary treatment options:  - Patient was advised to stay away from any heavy lifting. If needed, she was advised to squat and not bend forward. - Physician directed activity and lifestyle modifications.  Follow up in 7 weeks for reassessment.  I, Aidan Kay, attest that this documentation has been prepared under the direction and in the presence of Provider Otf Oneal, DO The documentation recorded by the scribe, in my presence, accurately reflects the service I personally performed, and the decisions made by me with my edits as appropriate.   Best Regards, Otf Oneal D.O.

## 2024-11-01 ENCOUNTER — APPOINTMENT (OUTPATIENT)
Dept: PAIN MANAGEMENT | Facility: CLINIC | Age: 39
End: 2024-11-01
Payer: COMMERCIAL

## 2024-11-01 DIAGNOSIS — M50.10 CERVICAL DISC DISORDER WITH RADICULOPATHY, UNSPECIFIED CERVICAL REGION: ICD-10-CM

## 2024-11-01 DIAGNOSIS — M54.12 RADICULOPATHY, CERVICAL REGION: ICD-10-CM

## 2024-11-01 PROCEDURE — 99213 OFFICE O/P EST LOW 20 MIN: CPT

## 2025-07-16 ENCOUNTER — APPOINTMENT (OUTPATIENT)
Dept: NEUROSURGERY | Facility: CLINIC | Age: 40
End: 2025-07-16
Payer: COMMERCIAL

## 2025-07-16 ENCOUNTER — NON-APPOINTMENT (OUTPATIENT)
Age: 40
End: 2025-07-16

## 2025-07-16 VITALS — BODY MASS INDEX: 26.24 KG/M2 | HEIGHT: 61 IN | WEIGHT: 139 LBS

## 2025-07-16 PROCEDURE — 99213 OFFICE O/P EST LOW 20 MIN: CPT
